# Patient Record
Sex: FEMALE | Race: WHITE | NOT HISPANIC OR LATINO | Employment: UNEMPLOYED | URBAN - METROPOLITAN AREA
[De-identification: names, ages, dates, MRNs, and addresses within clinical notes are randomized per-mention and may not be internally consistent; named-entity substitution may affect disease eponyms.]

---

## 2024-01-22 NOTE — PROGRESS NOTES
"HPI  Carli Solorzano IS A 23 y.o. G0 with a h/o PCOS presents for AUB and fertility counseling     H/o PCOS:  Was diagnosed with PCOS with an in office US by Dr Tomas in 2020. Result not available for review in  system.  Previous labs 12/2020 negative including CBC, prolactin, TSH, DHEA-S, testosterone.   Has always had issues with weight gain, difficult weight loss. Was able to lose almost 50 lbs with diet, exercise, supplements. Taking supplements Jalil-Inositol and other with successful weight loss.  Has been experiencing AUB but thinks this has normalized to q 30 day menses after weight loss. However, she does not have positive OPKs and does have intermenstrual spotting as well.   Denies acne, admits to hirsutism  FOB has never fathered a child. She has never been pregnant.  Denies h/o pelvic surgery, infection, significant pain issues with menses.     PSH: unilateral labioplasty 1/2021  OB hx: G0   GYN hx:   - menarche, menstrual pattern, LMP: AUB above  - Sexual activity/issues, STI protection/history, birth control:  Not on birth control  - HPV vaccine: incomplete  FH:  No GYN related cancers including breast, ovarian, endometrial, or colon cancer.     ROS notable for generally healthy, hair loss, abnormal hair growth, AUB  10 pt ROS negative except as listed above.      Past Medical History:   Diagnosis Date    PCOS (polycystic ovarian syndrome)        Family History   Problem Relation Name Age of Onset    Other (HTN) Father      Diabetes Maternal Grandfather      Breast cancer Paternal Grandmother           Past Surgical History:   Procedure Laterality Date    COSMETIC SURGERY  2021       VITAL SIGNS  /72   Ht 1.651 m (5' 5\")   Wt 87.5 kg (193 lb)   LMP 12/25/2023 (Exact Date)   BMI 32.12 kg/m²     Physical Exam  Constitutional:       Appearance: Normal appearance.   HENT:      Head: Normocephalic and atraumatic.   Eyes:      Extraocular Movements: Extraocular movements intact.      " Conjunctiva/sclera: Conjunctivae normal.      Pupils: Pupils are equal, round, and reactive to light.   Pulmonary:      Effort: Pulmonary effort is normal.   Abdominal:      General: Abdomen is flat.      Palpations: Abdomen is soft.   Genitourinary:     General: Normal vulva.      Vagina: Normal.      Cervix: Normal.      Uterus: Normal.       Adnexa: Right adnexa normal and left adnexa normal.   Skin:     General: Skin is warm and dry.   Neurological:      General: No focal deficit present.      Mental Status: She is alert.   Psychiatric:         Mood and Affect: Mood normal.         Behavior: Behavior normal.         Thought Content: Thought content normal.         Judgment: Judgment normal.         Assessment/Plan   AUB, likely secondary to PCOS  Reviewed the previous lab workup of 12/2020 with patient.  TVUS unavailable for review.  Discussed the diagnostic criteria and pathophysiology of PCOS.  She does have a history of oligomenorrhea as well as hirsutism.  Discussed the importance of figuring out her bleeding patterns now whether she is ovulating on a regular basis.  Discussed the risks of endometrial cancer/precancer.  Discussed the role of repeat labs and repeat TVUS for completion of workup  Labs ordered: CBC, TSH, Prolactin,  Hb A1c in the setting of BMI 32 and PCOS screening  RTC for EMB.  Patient to ensure she is not pregnant prior to presenting for this appointment.  No sex for 2 weeks or protected sex only prior to presenting for EMB  OPKs q 12 hrs during suspected ovulatory window  Recommended semen analysis for FOB.  Discussed the role of HSG for fertility workup   Discussed the role of IUI vs timed intercourse vs letrozole and timed intercourse or IUI. Discussed mosie baby.    Fertility:  Recommend HSG for complete workup prior to ovulation induction with meds (before starting Letrozole)  Prenatal vitamin and Vitamin D  Recommended semen analysis    Scribe Attestation  By signing my name below, I,  Keli Box   attest that this documentation has been prepared under the direction and in the presence of Will Parmar MD.

## 2024-01-23 ENCOUNTER — OFFICE VISIT (OUTPATIENT)
Dept: OBSTETRICS AND GYNECOLOGY | Facility: CLINIC | Age: 24
End: 2024-01-23
Payer: COMMERCIAL

## 2024-01-23 VITALS
WEIGHT: 193 LBS | SYSTOLIC BLOOD PRESSURE: 130 MMHG | BODY MASS INDEX: 32.15 KG/M2 | HEIGHT: 65 IN | DIASTOLIC BLOOD PRESSURE: 72 MMHG

## 2024-01-23 DIAGNOSIS — Z13.1 SCREENING FOR DIABETES MELLITUS: ICD-10-CM

## 2024-01-23 DIAGNOSIS — Z31.69 INFERTILITY COUNSELING: ICD-10-CM

## 2024-01-23 DIAGNOSIS — E28.2 PCOS (POLYCYSTIC OVARIAN SYNDROME): ICD-10-CM

## 2024-01-23 DIAGNOSIS — N93.9 ABNORMAL UTERINE BLEEDING (AUB): Primary | ICD-10-CM

## 2024-01-23 PROCEDURE — 99203 OFFICE O/P NEW LOW 30 MIN: CPT | Performed by: OBSTETRICS & GYNECOLOGY

## 2024-01-23 PROCEDURE — 1036F TOBACCO NON-USER: CPT | Performed by: OBSTETRICS & GYNECOLOGY

## 2024-01-23 RX ORDER — ERGOCALCIFEROL 1.25 MG/1
50000 CAPSULE ORAL WEEKLY
COMMUNITY
Start: 2020-12-14 | End: 2024-02-27 | Stop reason: WASHOUT

## 2024-01-30 ENCOUNTER — LAB (OUTPATIENT)
Dept: LAB | Facility: LAB | Age: 24
End: 2024-01-30
Payer: COMMERCIAL

## 2024-01-30 DIAGNOSIS — N93.9 ABNORMAL UTERINE BLEEDING (AUB): ICD-10-CM

## 2024-01-30 DIAGNOSIS — Z13.1 SCREENING FOR DIABETES MELLITUS: ICD-10-CM

## 2024-01-30 LAB
ERYTHROCYTE [DISTWIDTH] IN BLOOD BY AUTOMATED COUNT: 14.6 % (ref 11.5–14.5)
EST. AVERAGE GLUCOSE BLD GHB EST-MCNC: 94 MG/DL
HBA1C MFR BLD: 4.9 %
HCT VFR BLD AUTO: 41.1 % (ref 36–46)
HGB BLD-MCNC: 13.1 G/DL (ref 12–16)
MCH RBC QN AUTO: 28.4 PG (ref 26–34)
MCHC RBC AUTO-ENTMCNC: 31.9 G/DL (ref 32–36)
MCV RBC AUTO: 89 FL (ref 80–100)
NRBC BLD-RTO: 0 /100 WBCS (ref 0–0)
PLATELET # BLD AUTO: 215 X10*3/UL (ref 150–450)
PROLACTIN SERPL-MCNC: 10.9 UG/L (ref 3–20)
RBC # BLD AUTO: 4.61 X10*6/UL (ref 4–5.2)
TSH SERPL-ACNC: 2.87 MIU/L (ref 0.44–3.98)
WBC # BLD AUTO: 6.4 X10*3/UL (ref 4.4–11.3)

## 2024-01-30 PROCEDURE — 85027 COMPLETE CBC AUTOMATED: CPT

## 2024-01-30 PROCEDURE — 84443 ASSAY THYROID STIM HORMONE: CPT

## 2024-01-30 PROCEDURE — 36415 COLL VENOUS BLD VENIPUNCTURE: CPT

## 2024-01-30 PROCEDURE — 83036 HEMOGLOBIN GLYCOSYLATED A1C: CPT

## 2024-01-30 PROCEDURE — 84146 ASSAY OF PROLACTIN: CPT

## 2024-02-06 ENCOUNTER — PROCEDURE VISIT (OUTPATIENT)
Dept: OBSTETRICS AND GYNECOLOGY | Facility: CLINIC | Age: 24
End: 2024-02-06
Payer: COMMERCIAL

## 2024-02-06 VITALS — WEIGHT: 193 LBS | DIASTOLIC BLOOD PRESSURE: 80 MMHG | SYSTOLIC BLOOD PRESSURE: 128 MMHG | BODY MASS INDEX: 32.12 KG/M2

## 2024-02-06 DIAGNOSIS — R35.0 URINARY FREQUENCY: ICD-10-CM

## 2024-02-06 DIAGNOSIS — N89.8 VAGINAL ODOR: ICD-10-CM

## 2024-02-06 DIAGNOSIS — Z32.02 PREGNANCY TEST NEGATIVE: ICD-10-CM

## 2024-02-06 DIAGNOSIS — N93.9 ABNORMAL UTERINE BLEEDING (AUB): Primary | ICD-10-CM

## 2024-02-06 DIAGNOSIS — E28.2 PCOS (POLYCYSTIC OVARIAN SYNDROME): ICD-10-CM

## 2024-02-06 LAB
POC APPEARANCE, URINE: CLEAR
POC BILIRUBIN, URINE: NEGATIVE
POC BLOOD, URINE: NEGATIVE
POC COLOR, URINE: YELLOW
POC GLUCOSE, URINE: NEGATIVE MG/DL
POC KETONES, URINE: NEGATIVE MG/DL
POC LEUKOCYTES, URINE: NEGATIVE
POC NITRITE,URINE: NEGATIVE
POC PH, URINE: 8 PH
POC PROTEIN, URINE: ABNORMAL MG/DL
POC SPECIFIC GRAVITY, URINE: 1.01
POC UROBILINOGEN, URINE: 0.2 EU/DL
PREGNANCY TEST URINE, POC: NEGATIVE

## 2024-02-06 PROCEDURE — 81025 URINE PREGNANCY TEST: CPT | Performed by: OBSTETRICS & GYNECOLOGY

## 2024-02-06 PROCEDURE — 88305 TISSUE EXAM BY PATHOLOGIST: CPT

## 2024-02-06 PROCEDURE — 88305 TISSUE EXAM BY PATHOLOGIST: CPT | Performed by: PATHOLOGY

## 2024-02-06 PROCEDURE — 58100 BIOPSY OF UTERUS LINING: CPT | Performed by: OBSTETRICS & GYNECOLOGY

## 2024-02-06 PROCEDURE — 87205 SMEAR GRAM STAIN: CPT

## 2024-02-06 PROCEDURE — 81003 URINALYSIS AUTO W/O SCOPE: CPT | Performed by: OBSTETRICS & GYNECOLOGY

## 2024-02-06 NOTE — PROGRESS NOTES
HPI  Carli Solorzano is a 23 y.o.  with a history of PCOS and AUB who is presenting today for EMB.  Denies unprotected sexual intercourse in the last 2 weeks.  Just finished her menses  Endorses urinary urgency.   Suspects she experiences some vaginal malodor if she skips a shower or after coitus  Expresses concern about possibly vaginal infection. No vulvitis.     Lab Workup: 01/30/2024  CBC neg, Hb 13.1  HbA1c  4.9  Prolactin and TSH both negative    H/o PCOS:01/23/2024  Was diagnosed with PCOS with an in office US by Dr Tomas in 2020. Result not available for review in  system.  Previous labs 12/2020 negative including CBC, prolactin, TSH, DHEA-S, testosterone.   Has always had issues with weight gain, difficult weight loss. Was able to lose almost 50 lbs with diet, exercise, supplements. Taking supplements Jalil-Inositol and other with successful weight loss.  Has been experiencing AUB but thinks this has normalized to q 30 day menses after weight loss. However, she does not have positive OPKs and does have intermenstrual spotting as well.   Denies acne, admits to hirsutism  FOB has never fathered a child. She has never been pregnant.  Denies h/o pelvic surgery, infection, significant pain issues with menses.      PSH: unilateral labioplasty 1/2021  OB hx: G0   FH:  No GYN related cancers including breast, ovarian, endometrial, or colon cancer.     Review of Systems   Genitourinary:  Positive for non-menstrual bleeding.       VITAL SIGNS  LMP 12/25/2023 (Exact Date)     Endometrial biopsy    Date/Time: 2/6/2024 3:24 PM    Performed by: Will Parmar MD  Authorized by: Will Parmar MD    Consent:     Consent obtained: verbal and written    Consent given by: patient    Risks discussed: bleeding and pain    Patient agrees, verbalizes understanding, and wants to proceed: yes      Procedure explained and questions answered to patient or proxy's satisfaction: yes    Indications:     Indications: abnormal uterine  bleeding    Pre-procedure:     Urine pregnancy test: negative    Procedure:     A bimanual exam was performed: yes      Uterus size: non-gravid    Prepped with: Betadine    Tenaculum used: yes      A local block was performed: yes      Block method: intracervical      Local anesthetic: lidocaine 1% w/o epi    Amount used (mL): 1    Number of passes: 3  Findings:     Cervix: normal      Specimen collected: specimen collected and sent to pathology      Patient tolerance: tolerated well, no immediate complications      Assessment/Plan   AUB/PCOS  -Desires fertility  -Reviewed negative labs including CBC, prolactin, TSH, HbA1c  -Reviewed purpose of EMB and differential diagnosis  - hCG negative  - EMB performed. Patient tolerated it well  - BV/yeast swab collected for vaginal malodor.   - urine dip for urinary frequency.  Will culture as indicated based on the  RTC in 3 weeks for review of results and management plan    Scribe Attestation  By signing my name below, Natty KRAMER, Scribe   attest that this documentation has been prepared under the direction and in the presence of Will Parmar MD.

## 2024-02-07 LAB
CLUE CELLS VAG LPF-#/AREA: NORMAL /[LPF]
NUGENT SCORE: 1
YEAST VAG WET PREP-#/AREA: NORMAL

## 2024-02-09 LAB
LABORATORY COMMENT REPORT: NORMAL
PATH REPORT.FINAL DX SPEC: NORMAL
PATH REPORT.GROSS SPEC: NORMAL
PATH REPORT.RELEVANT HX SPEC: NORMAL
PATH REPORT.TOTAL CANCER: NORMAL

## 2024-02-27 ENCOUNTER — OFFICE VISIT (OUTPATIENT)
Dept: OBSTETRICS AND GYNECOLOGY | Facility: CLINIC | Age: 24
End: 2024-02-27
Payer: COMMERCIAL

## 2024-02-27 ENCOUNTER — LAB (OUTPATIENT)
Dept: LAB | Facility: LAB | Age: 24
End: 2024-02-27
Payer: COMMERCIAL

## 2024-02-27 VITALS
SYSTOLIC BLOOD PRESSURE: 110 MMHG | WEIGHT: 194 LBS | DIASTOLIC BLOOD PRESSURE: 70 MMHG | BODY MASS INDEX: 32.32 KG/M2 | HEIGHT: 65 IN

## 2024-02-27 DIAGNOSIS — Z31.41 FERTILITY TESTING: ICD-10-CM

## 2024-02-27 DIAGNOSIS — E28.2 PCOS (POLYCYSTIC OVARIAN SYNDROME): ICD-10-CM

## 2024-02-27 DIAGNOSIS — E28.2 PCOS (POLYCYSTIC OVARIAN SYNDROME): Primary | ICD-10-CM

## 2024-02-27 PROCEDURE — 99213 OFFICE O/P EST LOW 20 MIN: CPT | Performed by: OBSTETRICS & GYNECOLOGY

## 2024-02-27 PROCEDURE — 1036F TOBACCO NON-USER: CPT | Performed by: OBSTETRICS & GYNECOLOGY

## 2024-02-27 PROCEDURE — 83516 IMMUNOASSAY NONANTIBODY: CPT

## 2024-02-27 PROCEDURE — 36415 COLL VENOUS BLD VENIPUNCTURE: CPT

## 2024-02-27 RX ORDER — ACETAMINOPHEN 500 MG
TABLET ORAL DAILY
COMMUNITY

## 2024-02-27 RX ORDER — LETROZOLE 2.5 MG/1
TABLET, FILM COATED ORAL
Qty: 15 TABLET | Refills: 0 | Status: SHIPPED | OUTPATIENT
Start: 2024-02-27

## 2024-02-27 NOTE — PROGRESS NOTES
"HPI  Carli Solorzano is a 23 y.o.  Go with a h/o PCOS and infertility who is presenting today for follow-up     s/p EMB  Notes some bleeding after the EMB but only lasted that same day  Mentions she is taking a supplement and is unsure if it is contributing to her symptoms  Recalls she has been trying to conceive since 2020  Mentions they did a home test for semen analysis which came out negative.    EMB Biopsy done 02/06/2024:  Proliferative phase endometrium    Hx: 02/06/2024  Denies unprotected sexual intercourse in the last 2 weeks.  Just finished her menses  Endorses urinary urgency.   Suspects she experiences some vaginal malodor if she skips a shower or after coitus  Expresses concern about possibly vaginal infection. No vulvitis.      Lab Workup: 01/30/2024  CBC neg, Hb 13.1  HbA1c  4.9  Prolactin and TSH both negative     H/o PCOS:01/23/2024  Was diagnosed with PCOS with an in office US by Dr Tomas in 2020. Result not available for review in  system.  Previous labs 12/2020 negative including CBC, prolactin, TSH, DHEA-S, testosterone.   Has always had issues with weight gain, difficult weight loss. Was able to lose almost 50 lbs with diet, exercise, supplements. Taking supplements Jalil-Inositol and other with successful weight loss.  Has been experiencing AUB but thinks this has normalized to q 30 day menses after weight loss. However, she does not have positive OPKs and does have intermenstrual spotting as well.   Denies acne, admits to hirsutism  FOB has never fathered a child. She has never been pregnant.  Denies h/o pelvic surgery, infection, significant pain issues with menses.     Review of Systems   All other systems reviewed and are negative.      VITAL SIGNS  /70   Ht 1.651 m (5' 5\")   Wt 88 kg (194 lb)   LMP 02/26/2024   BMI 32.28 kg/m²     Physical Exam  Constitutional:       Appearance: Normal appearance.   HENT:      Head: Normocephalic and atraumatic.   Eyes:      Extraocular " Movements: Extraocular movements intact.      Conjunctiva/sclera: Conjunctivae normal.      Pupils: Pupils are equal, round, and reactive to light.   Pulmonary:      Effort: Pulmonary effort is normal.   Skin:     General: Skin is dry.   Neurological:      General: No focal deficit present.      Mental Status: She is alert.   Psychiatric:         Mood and Affect: Mood normal.         Behavior: Behavior normal.         Thought Content: Thought content normal.         Judgment: Judgment normal.       Assessment/Plan   Subfertility in the setting of PCOS:  -Reviewed and discussed EMB results with patient  - Patient will like to try a repeat home semen analysis test. Recommended semen analysis with 's PCP if they decide to pursue it.  -Discussed the physiology of a menstrual cycle and the role of Ovulation predictor kits  -Plan for HSG  -Discussed the risks and benefits of using Letrozole for ovulation induction including Ovulation Hyperstimulation Syndrome (OHS)    Scribe Attestation  By signing my name below, Natty KRAMER, Scribe   attest that this documentation has been prepared under the direction and in the presence of Will Parmar MD.

## 2024-03-01 ENCOUNTER — TELEPHONE (OUTPATIENT)
Dept: OBSTETRICS AND GYNECOLOGY | Facility: CLINIC | Age: 24
End: 2024-03-01
Payer: COMMERCIAL

## 2024-03-01 LAB — MIS SERPL-MCNC: 10.61 NG/ML (ref 0.4–16.02)
